# Patient Record
Sex: FEMALE | Race: WHITE | NOT HISPANIC OR LATINO
[De-identification: names, ages, dates, MRNs, and addresses within clinical notes are randomized per-mention and may not be internally consistent; named-entity substitution may affect disease eponyms.]

---
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
## 2018-02-07 PROBLEM — Z00.00 ENCOUNTER FOR PREVENTIVE HEALTH EXAMINATION: Status: ACTIVE | Noted: 2018-02-07

## 2018-03-23 ENCOUNTER — APPOINTMENT (OUTPATIENT)
Dept: NEUROLOGY | Facility: CLINIC | Age: 18
End: 2018-03-23
Payer: COMMERCIAL

## 2018-03-23 VITALS
SYSTOLIC BLOOD PRESSURE: 130 MMHG | WEIGHT: 125 LBS | BODY MASS INDEX: 23.6 KG/M2 | HEIGHT: 61 IN | DIASTOLIC BLOOD PRESSURE: 64 MMHG

## 2018-03-23 DIAGNOSIS — R55 SYNCOPE AND COLLAPSE: ICD-10-CM

## 2018-03-23 DIAGNOSIS — Z78.9 OTHER SPECIFIED HEALTH STATUS: ICD-10-CM

## 2018-03-23 PROCEDURE — 99204 OFFICE O/P NEW MOD 45 MIN: CPT

## 2018-03-23 RX ORDER — LEVONORGESTREL AND ETHINYL ESTRADIOL 0.1-0.02MG
KIT ORAL
Refills: 0 | Status: ACTIVE | COMMUNITY

## 2018-03-23 RX ORDER — AZELASTINE HYDROCHLORIDE 0.5 MG/ML
0.05 SOLUTION/ DROPS OPHTHALMIC
Refills: 0 | Status: ACTIVE | COMMUNITY

## 2018-03-23 RX ORDER — DAPSONE 50 MG/G
5 GEL TOPICAL
Refills: 0 | Status: ACTIVE | COMMUNITY

## 2018-03-23 RX ORDER — ADAPALENE AND BENZOYL PEROXIDE 1; 25 MG/G; MG/G
0.1-2.5 GEL TOPICAL
Refills: 0 | Status: ACTIVE | COMMUNITY

## 2018-06-18 ENCOUNTER — RESULT REVIEW (OUTPATIENT)
Age: 18
End: 2018-06-18

## 2018-06-22 ENCOUNTER — APPOINTMENT (OUTPATIENT)
Dept: NEUROLOGY | Facility: CLINIC | Age: 18
End: 2018-06-22
Payer: COMMERCIAL

## 2018-06-22 VITALS
BODY MASS INDEX: 23.6 KG/M2 | DIASTOLIC BLOOD PRESSURE: 80 MMHG | SYSTOLIC BLOOD PRESSURE: 110 MMHG | HEIGHT: 61 IN | WEIGHT: 125 LBS

## 2018-06-22 PROCEDURE — 99213 OFFICE O/P EST LOW 20 MIN: CPT

## 2018-12-28 ENCOUNTER — APPOINTMENT (OUTPATIENT)
Dept: NEUROLOGY | Facility: CLINIC | Age: 18
End: 2018-12-28
Payer: COMMERCIAL

## 2018-12-28 VITALS
DIASTOLIC BLOOD PRESSURE: 70 MMHG | WEIGHT: 125 LBS | BODY MASS INDEX: 23.6 KG/M2 | SYSTOLIC BLOOD PRESSURE: 120 MMHG | HEIGHT: 61 IN

## 2018-12-28 PROCEDURE — 99213 OFFICE O/P EST LOW 20 MIN: CPT

## 2019-06-17 ENCOUNTER — APPOINTMENT (OUTPATIENT)
Dept: NEUROLOGY | Facility: CLINIC | Age: 19
End: 2019-06-17
Payer: COMMERCIAL

## 2019-06-17 VITALS
DIASTOLIC BLOOD PRESSURE: 70 MMHG | HEIGHT: 61 IN | WEIGHT: 130 LBS | BODY MASS INDEX: 24.55 KG/M2 | SYSTOLIC BLOOD PRESSURE: 110 MMHG

## 2019-06-17 PROCEDURE — 99213 OFFICE O/P EST LOW 20 MIN: CPT

## 2019-06-17 NOTE — ASSESSMENT
[FreeTextEntry1] : This is a 19-year-old woman with stable migraine headache. She had 3 migraine since her last visit. They're relieved with rizatriptan. I still would not start preventive medication. I will see her back in 6 months, sooner should the need arise. I asked her to call me should she have any increase in frequency or severity of her headaches between now and her next appointment.

## 2019-06-17 NOTE — PHYSICAL EXAM
[Person] : oriented to person [Place] : oriented to place [Time] : oriented to time [Remote Intact] : remote memory intact [Registration Intact] : recent registration memory intact [Span Intact] : the attention span was normal [Concentration Intact] : normal concentrating ability [Visual Intact] : visual attention was ~T not ~L decreased [Naming Objects] : no difficulty naming common objects [Comprehension] : comprehension intact [Fluency] : fluency intact [Repeating Phrases] : no difficulty repeating a phrase [Past History] : adequate knowledge of personal past history [Current Events] : adequate knowledge of current events [Cranial Nerves Oculomotor (III)] : extraocular motion intact [Cranial Nerves Optic (II)] : visual acuity intact bilaterally,  visual fields full to confrontation, pupils equal round and reactive to light [Cranial Nerves Trigeminal (V)] : facial sensation intact symmetrically [Cranial Nerves Vestibulocochlear (VIII)] : hearing was intact bilaterally [Cranial Nerves Facial (VII)] : face symmetrical [Cranial Nerves Accessory (XI - Cranial And Spinal)] : head turning and shoulder shrug symmetric [Cranial Nerves Glossopharyngeal (IX)] : tongue and palate midline [Motor Tone] : muscle tone was normal in all four extremities [Motor Strength] : muscle strength was normal in all four extremities [Cranial Nerves Hypoglossal (XII)] : there was no tongue deviation with protrusion [Involuntary Movements] : no involuntary movements were seen [No Muscle Atrophy] : normal bulk in all four extremities [Motor Handedness Right-Handed] : the patient is right hand dominant [Paresis Pronator Drift Left-Sided] : no pronator drift on the left [Paresis Pronator Drift Right-Sided] : no pronator drift on the right [Sensation Tactile Decrease] : light touch was intact [Sensation Pain / Temperature Decrease] : pain and temperature was intact [Sensation Vibration Decrease] : vibration was intact [Abnormal Walk] : normal gait [Proprioception] : proprioception was intact [Tremor] : no tremor present [Coordination - Dysmetria Impaired Finger-to-Nose Bilateral] : not present [Balance] : balance was intact [2+] : Patella left 2+ [Sclera] : the sclera and conjunctiva were normal [PERRL With Normal Accommodation] : pupils were equal in size, round, reactive to light, with normal accommodation [Extraocular Movements] : extraocular movements were intact [Full Visual Field] : full visual field [No APD] : no afferent pupillary defect [No TRINIDAD] : no internuclear ophthalmoplegia

## 2019-06-17 NOTE — CONSULT LETTER
[Courtesy Letter:] : I had the pleasure of seeing your patient, [unfilled], in my office today. [Dear  ___] : Dear  [unfilled], [Please see my note below.] : Please see my note below. [Consult Closing:] : Thank you very much for allowing me to participate in the care of this patient.  If you have any questions, please do not hesitate to contact me. [FreeTextEntry3] : Parish Ramirez M.D., Ph.D. DPN-N\par [facility]\par Neurology at [place]\par Medical Director of Stroke Services\par [facility]\par  [Sincerely,] : Sincerely,

## 2019-06-20 ENCOUNTER — RESULT REVIEW (OUTPATIENT)
Age: 19
End: 2019-06-20

## 2019-12-23 ENCOUNTER — APPOINTMENT (OUTPATIENT)
Dept: NEUROLOGY | Facility: CLINIC | Age: 19
End: 2019-12-23
Payer: COMMERCIAL

## 2019-12-23 VITALS
WEIGHT: 130 LBS | SYSTOLIC BLOOD PRESSURE: 115 MMHG | BODY MASS INDEX: 24.55 KG/M2 | DIASTOLIC BLOOD PRESSURE: 70 MMHG | HEIGHT: 61 IN

## 2019-12-23 PROCEDURE — 99213 OFFICE O/P EST LOW 20 MIN: CPT

## 2019-12-23 NOTE — ASSESSMENT
[FreeTextEntry1] : This is a 19-year-old woman with migraine headaches, there will treated with rizatriptan as needed. She is not having headaches frequently enough to need preventative medication. I will continue with as needed rizatriptan and see her back in the office in 6 months, sooner should the need arise. I did ask her to call me should she have any change in frequency or severity of her headaches that may necessitate a medication adjustment or office visit.

## 2019-12-23 NOTE — HISTORY OF PRESENT ILLNESS
[FreeTextEntry1] : Initial office visit March 23, 2018:\par This is a 18-year-old woman who comes here today for evaluation of migraine headache. She states that since November of 2017 she started to get headaches. They are usually unilateral and start the temple and go down the side of her face into her jaw. She states she feels as if her head is being squeezed at times. She also feels a throbbing pressure in her head. These headaches are accompanied with photophobia where she needs to lie in a dark quiet room as well as nausea without vomiting. 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 She is having these headaches on average once or twice per month. She was given rizatriptan which helps but not 100%. She is here today for neurologic evaluation and treatment.\par \par Followup June 22, 2018:\par This is a 18-year-old woman who returns today for neurologic followup of migraine headache. Since her last visit she states she's modified her diet which is helped with her headaches significantly. She is now having them on average once a month and they're mostly associated with her period. She did go out for fast food one day and had a headache after that. Other than that she's been doing well taking rizatriptan as needed. She is here today for neurologic followup.\par \par Followup December 28, 2018:\par This is a 18-year-old woman who presents today for followup of migraine headaches. She states that in the 6 months since her last visit she's had 3 migraine headaches. These were relieved with her rizatriptan. She does get a tingling and kind of feeling in her arms when she takes Iressa tripped and has trouble using them for an hour or so. This sensation stops and her headache is controlled. She also has smaller headaches around the time of her periods which she treats with as needed ibuprofen. She is here today for neurologic followup overall doing very well.\par \par Followup June 17, 2019:\par This is a 19-year-old woman who presents today for followup of migraine headaches. Since her last visit she had 2 migraines in March and one migraine this month. Other than that she's been free of migraines but has some mild headaches occasionally, especially during her menstrual period. She gets relief from the rise a trip and when she takes it. She is here today for neurologic followup.\par \par Followup December 23, 2019:\par This is a 19-year-old woman who presents today for followup of migraine headache. She is averaging one to 2 migraine headaches per month. She had a bit of her time in September with more frequent headaches. She attributes this to stress and increased workload at school. She also has increased migraines with raining changes in barometric pressure. She reports that the rizatriptan still helps although sometimes she still needs to sleep off the headaches afterwards. She is here today for neurologic followup.

## 2019-12-23 NOTE — PHYSICAL EXAM
[Person] : oriented to person [Place] : oriented to place [Time] : oriented to time [Remote Intact] : remote memory intact [Registration Intact] : recent registration memory intact [Span Intact] : the attention span was normal [Concentration Intact] : normal concentrating ability [Visual Intact] : visual attention was ~T not ~L decreased [Naming Objects] : no difficulty naming common objects [Repeating Phrases] : no difficulty repeating a phrase [Fluency] : fluency intact [Comprehension] : comprehension intact [Current Events] : adequate knowledge of current events [Past History] : adequate knowledge of personal past history [Cranial Nerves Optic (II)] : visual acuity intact bilaterally,  visual fields full to confrontation, pupils equal round and reactive to light [Cranial Nerves Oculomotor (III)] : extraocular motion intact [Cranial Nerves Trigeminal (V)] : facial sensation intact symmetrically [Cranial Nerves Facial (VII)] : face symmetrical [Cranial Nerves Glossopharyngeal (IX)] : tongue and palate midline [Cranial Nerves Vestibulocochlear (VIII)] : hearing was intact bilaterally [Cranial Nerves Hypoglossal (XII)] : there was no tongue deviation with protrusion [Motor Tone] : muscle tone was normal in all four extremities [Cranial Nerves Accessory (XI - Cranial And Spinal)] : head turning and shoulder shrug symmetric [Motor Strength] : muscle strength was normal in all four extremities [Involuntary Movements] : no involuntary movements were seen [No Muscle Atrophy] : normal bulk in all four extremities [Motor Handedness Right-Handed] : the patient is right hand dominant [Paresis Pronator Drift Right-Sided] : no pronator drift on the right [Paresis Pronator Drift Left-Sided] : no pronator drift on the left [Sensation Tactile Decrease] : light touch was intact [Sensation Vibration Decrease] : vibration was intact [Sensation Pain / Temperature Decrease] : pain and temperature was intact [Proprioception] : proprioception was intact [Abnormal Walk] : normal gait [Balance] : balance was intact [Tremor] : no tremor present [Coordination - Dysmetria Impaired Finger-to-Nose Bilateral] : not present [2+] : Patella left 2+ [Sclera] : the sclera and conjunctiva were normal [PERRL With Normal Accommodation] : pupils were equal in size, round, reactive to light, with normal accommodation [Extraocular Movements] : extraocular movements were intact [No APD] : no afferent pupillary defect [No TRINIDAD] : no internuclear ophthalmoplegia [Full Visual Field] : full visual field

## 2019-12-23 NOTE — CONSULT LETTER
[Dear  ___] : Dear  [unfilled], [Courtesy Letter:] : I had the pleasure of seeing your patient, [unfilled], in my office today. [Please see my note below.] : Please see my note below. [Consult Closing:] : Thank you very much for allowing me to participate in the care of this patient.  If you have any questions, please do not hesitate to contact me. [Sincerely,] : Sincerely, [FreeTextEntry3] : Parish Ramirez M.D., Ph.D. DPN-N\par [facility]\par Neurology at [place]\par Medical Director of Stroke Services\par [facility]\par

## 2020-06-22 ENCOUNTER — APPOINTMENT (OUTPATIENT)
Dept: NEUROLOGY | Facility: CLINIC | Age: 20
End: 2020-06-22
Payer: COMMERCIAL

## 2020-06-22 PROCEDURE — 99213 OFFICE O/P EST LOW 20 MIN: CPT | Mod: 95

## 2020-06-22 NOTE — HISTORY OF PRESENT ILLNESS
[Home] : at home, [unfilled] , at the time of the visit. [Verbal consent obtained from patient] : the patient, [unfilled] [Medical Office: ([facility])___] : at the medical office located in  [FreeTextEntry1] : Initial office visit March 23, 2018:\par This is a 18-year-old woman who comes here today for evaluation of migraine headache. She states that since November of 2017 she started to get headaches. They are usually unilateral and start the temple and go down the side of her face into her jaw. She states she feels as if her head is being squeezed at times. She also feels a throbbing pressure in her head. These headaches are accompanied with photophobia where she needs to lie in a dark quiet room as well as nausea without vomiting. 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 She is having these headaches on average once or twice per month. She was given rizatriptan which helps but not 100%. She is here today for neurologic evaluation and treatment.\par \par Followup June 22, 2018:\par This is a 18-year-old woman who returns today for neurologic followup of migraine headache. Since her last visit she states she's modified her diet which is helped with her headaches significantly. She is now having them on average once a month and they're mostly associated with her period. She did go out for fast food one day and had a headache after that. Other than that she's been doing well taking rizatriptan as needed. She is here today for neurologic followup.\par \par Followup December 28, 2018:\par This is a 18-year-old woman who presents today for followup of migraine headaches. She states that in the 6 months since her last visit she's had 3 migraine headaches. These were relieved with her rizatriptan. She does get a tingling and kind of feeling in her arms when she takes Iressa tripped and has trouble using them for an hour or so. This sensation stops and her headache is controlled. She also has smaller headaches around the time of her periods which she treats with as needed ibuprofen. She is here today for neurologic followup overall doing very well.\par \par Followup June 17, 2019:\par This is a 19-year-old woman who presents today for followup of migraine headaches. Since her last visit she had 2 migraines in March and one migraine this month. Other than that she's been free of migraines but has some mild headaches occasionally, especially during her menstrual period. She gets relief from the rise a trip and when she takes it. She is here today for neurologic followup.\par \par Followup December 23, 2019:\par This is a 19-year-old woman who presents today for followup of migraine headache. She is averaging one to 2 migraine headaches per month. She had a bit of her time in September with more frequent headaches. She attributes this to stress and increased workload at school. She also has increased migraines with raining changes in barometric pressure. She reports that the rizatriptan still helps although sometimes she still needs to sleep off the headaches afterwards. She is here today for neurologic followup.\par \par Followup June 22, 2020:\par This is a 20-year-old woman who presents today for followup of migraine headache. This is done via video conference during the corona virus outbreak. Verbal consent is obtained at the beginning of the visit. She states that she is getting a bit more migraines. This is due to increased stress as well as heat brings on her migraines. She continues to take rizatriptan as needed which is affective. As opposed to having one to 2 migraines per month she is currently having 3 or maybe 4 migraines per month. Now that she has graduated college her stress levels a bit data and hopefully her migraines will subside a bit. She is here today via video for routine neurologic followup.

## 2020-06-22 NOTE — ASSESSMENT
[FreeTextEntry1] : This is a 21-year-old woman with migraine headache. They respond well to rizatriptan. I will continue this. She is having about 3 or 4 headaches per month, this is due to increased stress and hopefully now that her stress level is hopefully a bit lower her frequency will also diminished. I would not start a preventive medicine at this time. She agrees with that. I will see her back in 6 months, sooner should the need arise.

## 2020-06-22 NOTE — CONSULT LETTER
[Dear  ___] : Dear  [unfilled], [Courtesy Letter:] : I had the pleasure of seeing your patient, [unfilled], in my office today. [Consult Closing:] : Thank you very much for allowing me to participate in the care of this patient.  If you have any questions, please do not hesitate to contact me. [Please see my note below.] : Please see my note below. [FreeTextEntry3] : Parish Ramirez M.D., Ph.D. DPN-N\par [facility]\par Neurology at [place]\par Medical Director of Stroke Services\par [facility]\par  [Sincerely,] : Sincerely,

## 2020-06-26 ENCOUNTER — RESULT REVIEW (OUTPATIENT)
Age: 20
End: 2020-06-26

## 2020-12-28 ENCOUNTER — APPOINTMENT (OUTPATIENT)
Dept: NEUROLOGY | Facility: CLINIC | Age: 20
End: 2020-12-28
Payer: COMMERCIAL

## 2020-12-28 PROCEDURE — 99213 OFFICE O/P EST LOW 20 MIN: CPT | Mod: 95

## 2020-12-28 RX ORDER — BUPROPION HYDROCHLORIDE 150 MG/1
150 TABLET, EXTENDED RELEASE ORAL
Qty: 30 | Refills: 0 | Status: ACTIVE | COMMUNITY
Start: 2020-11-19

## 2020-12-28 NOTE — HISTORY OF PRESENT ILLNESS
[Home] : at home, [unfilled] , at the time of the visit. [Medical Office: ([facility])___] : at the medical office located in  [Verbal consent obtained from patient] : the patient, [unfilled] [FreeTextEntry1] : Initial office visit March 23, 2018:\par This is a 18-year-old woman who comes here today for evaluation of migraine headache. She states that since November of 2017 she started to get headaches. They are usually unilateral and start the temple and go down the side of her face into her jaw. She states she feels as if her head is being squeezed at times. She also feels a throbbing pressure in her head. These headaches are accompanied with photophobia where she needs to lie in a dark quiet room as well as nausea without vomiting. 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 She is having these headaches on average once or twice per month. She was given rizatriptan which helps but not 100%. She is here today for neurologic evaluation and treatment.\par \par Followup June 22, 2018:\par This is a 18-year-old woman who returns today for neurologic followup of migraine headache. Since her last visit she states she's modified her diet which is helped with her headaches significantly. She is now having them on average once a month and they're mostly associated with her period. She did go out for fast food one day and had a headache after that. Other than that she's been doing well taking rizatriptan as needed. She is here today for neurologic followup.\par \par Followup December 28, 2018:\par This is a 18-year-old woman who presents today for followup of migraine headaches. She states that in the 6 months since her last visit she's had 3 migraine headaches. These were relieved with her rizatriptan. She does get a tingling and kind of feeling in her arms when she takes Iressa tripped and has trouble using them for an hour or so. This sensation stops and her headache is controlled. She also has smaller headaches around the time of her periods which she treats with as needed ibuprofen. She is here today for neurologic followup overall doing very well.\par \par Followup June 17, 2019:\par This is a 19-year-old woman who presents today for followup of migraine headaches. Since her last visit she had 2 migraines in March and one migraine this month. Other than that she's been free of migraines but has some mild headaches occasionally, especially during her menstrual period. She gets relief from the rise a trip and when she takes it. She is here today for neurologic followup.\par \par Followup December 23, 2019:\par This is a 19-year-old woman who presents today for followup of migraine headache. She is averaging one to 2 migraine headaches per month. She had a bit of her time in September with more frequent headaches. She attributes this to stress and increased workload at school. She also has increased migraines with raining changes in barometric pressure. She reports that the rizatriptan still helps although sometimes she still needs to sleep off the headaches afterwards. She is here today for neurologic followup.\par \par Followup June 22, 2020:\par This is a 20-year-old woman who presents today for followup of migraine headache. This is done via video conference during the corona virus outbreak. Verbal consent is obtained at the beginning of the visit. She states that she is getting a bit more migraines. This is due to increased stress as well as heat brings on her migraines. She continues to take rizatriptan as needed which is affective. As opposed to having one to 2 migraines per month she is currently having 3 or maybe 4 migraines per month. Now that she has graduated college her stress levels a bit data and hopefully her migraines will subside a bit. She is here today via video for routine neurologic followup.\par \par Followup December 28, 2020:\par This is a 20-year-old woman who presents today for neurologic followup of migraine headache. She is seen via video visit during corona virus outbreak. Verbal consent was obtained at the time of the visit. She states that she is currently doing very well. Since her last visit she has started treatment for anxiety with Wellbutrin. Since starting the Wellbutrin she's had no further migraine headaches. Prior to starting at her migraine headaches were increased. She states she hasn't had the need to take Maxalt in over 3 months now. She is here today by video for neurologic followup doing quite well.

## 2020-12-28 NOTE — ASSESSMENT
[FreeTextEntry1] : This is a 20-year-old woman with migraine headache. She's doing much better since her anxiety is being treated better. At this point I would recommend to continue Wellbutrin per her psychiatric nurse practitioner. She will continue taking Maxalt as needed. I will see her back in 6 months, sooner should the need arise. I asked her to call me in the interim with any problems, questions or concerns.

## 2020-12-28 NOTE — CONSULT LETTER
[Dear  ___] : Dear  [unfilled], [Courtesy Letter:] : I had the pleasure of seeing your patient, [unfilled], in my office today. [Please see my note below.] : Please see my note below. [Consult Closing:] : Thank you very much for allowing me to participate in the care of this patient.  If you have any questions, please do not hesitate to contact me. [Sincerely,] : Sincerely, [FreeTextEntry3] : Parish Ramirez M.D., Ph.D. DPN-N\par [facility]\par Neurology at [place]\par Medical Director of Stroke Services\par [facility]\par

## 2021-06-29 ENCOUNTER — APPOINTMENT (OUTPATIENT)
Dept: NEUROLOGY | Facility: CLINIC | Age: 21
End: 2021-06-29
Payer: COMMERCIAL

## 2021-06-29 PROCEDURE — 99213 OFFICE O/P EST LOW 20 MIN: CPT

## 2021-06-29 PROCEDURE — 99072 ADDL SUPL MATRL&STAF TM PHE: CPT

## 2021-06-29 NOTE — CONSULT LETTER
[Dear  ___] : Dear  [unfilled], [Courtesy Letter:] : I had the pleasure of seeing your patient, [unfilled], in my office today. [Please see my note below.] : Please see my note below. [Consult Closing:] : Thank you very much for allowing me to participate in the care of this patient.  If you have any questions, please do not hesitate to contact me. [Sincerely,] : Sincerely, [FreeTextEntry3] : Parish Ramirez M.D., Ph.D. DPN-N\par [facility]\par Neurology at [place]\par Medical Director of Stroke Services\par [facility]\par

## 2021-06-29 NOTE — HISTORY OF PRESENT ILLNESS
[FreeTextEntry1] : Initial office visit March 23, 2018:\par This is a 18-year-old woman who comes here today for evaluation of migraine headache. She states that since November of 2017 she started to get headaches. They are usually unilateral and start the temple and go down the side of her face into her jaw. She states she feels as if her head is being squeezed at times. She also feels a throbbing pressure in her head. These headaches are accompanied with photophobia where she needs to lie in a dark quiet room as well as nausea without vomiting. 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 She is having these headaches on average once or twice per month. She was given rizatriptan which helps but not 100%. She is here today for neurologic evaluation and treatment.\par \par Followup June 22, 2018:\par This is a 18-year-old woman who returns today for neurologic followup of migraine headache. Since her last visit she states she's modified her diet which is helped with her headaches significantly. She is now having them on average once a month and they're mostly associated with her period. She did go out for fast food one day and had a headache after that. Other than that she's been doing well taking rizatriptan as needed. She is here today for neurologic followup.\par \par Followup December 28, 2018:\par This is a 18-year-old woman who presents today for followup of migraine headaches. She states that in the 6 months since her last visit she's had 3 migraine headaches. These were relieved with her rizatriptan. She does get a tingling and kind of feeling in her arms when she takes Iressa tripped and has trouble using them for an hour or so. This sensation stops and her headache is controlled. She also has smaller headaches around the time of her periods which she treats with as needed ibuprofen. She is here today for neurologic followup overall doing very well.\par \par Followup June 17, 2019:\par This is a 19-year-old woman who presents today for followup of migraine headaches. Since her last visit she had 2 migraines in March and one migraine this month. Other than that she's been free of migraines but has some mild headaches occasionally, especially during her menstrual period. She gets relief from the rise a trip and when she takes it. She is here today for neurologic followup.\par \par Followup December 23, 2019:\par This is a 19-year-old woman who presents today for followup of migraine headache. She is averaging one to 2 migraine headaches per month. She had a bit of her time in September with more frequent headaches. She attributes this to stress and increased workload at school. She also has increased migraines with raining changes in barometric pressure. She reports that the rizatriptan still helps although sometimes she still needs to sleep off the headaches afterwards. She is here today for neurologic followup.\par \par Followup June 22, 2020:\par This is a 20-year-old woman who presents today for followup of migraine headache. This is done via video conference during the corona virus outbreak. Verbal consent is obtained at the beginning of the visit. She states that she is getting a bit more migraines. This is due to increased stress as well as heat brings on her migraines. She continues to take rizatriptan as needed which is affective. As opposed to having one to 2 migraines per month she is currently having 3 or maybe 4 migraines per month. Now that she has graduated college her stress levels a bit data and hopefully her migraines will subside a bit. She is here today via video for routine neurologic followup.\par \par Followup December 28, 2020:\par This is a 20-year-old woman who presents today for neurologic followup of migraine headache. She is seen via video visit during corona virus outbreak. Verbal consent was obtained at the time of the visit. She states that she is currently doing very well. Since her last visit she has started treatment for anxiety with Wellbutrin. Since starting the Wellbutrin she's had no further migraine headaches. Prior to starting at her migraine headaches were increased. She states she hasn't had the need to take Maxalt in over 3 months now. She is here today by video for neurologic followup doing quite well.\par \par Followup June 29, 2021:\par This is a 21-year-old woman who presents today for followup of migraine headache. Since her last visit she is been experiencing a few more migraine headaches. However these are still treated with Maxalt well. She states that they have always been some months where she has more headaches another month where she has severe headaches. The headaches are similar in intensity and quality and character. She is here today for routine neurologic followup.

## 2021-06-29 NOTE — ASSESSMENT
[FreeTextEntry1] : This is a 21-year-old woman with migraine headache. She is having her headaches a month. They're not enough to warrant her preventative therapy. They're still responding well to Maxalt. I will continue Maxalt for her headaches and see her back in 6 months, sooner should the need arise.

## 2021-06-29 NOTE — PHYSICAL EXAM

## 2021-07-07 ENCOUNTER — RESULT REVIEW (OUTPATIENT)
Age: 21
End: 2021-07-07

## 2022-01-13 ENCOUNTER — APPOINTMENT (OUTPATIENT)
Dept: NEUROLOGY | Facility: CLINIC | Age: 22
End: 2022-01-13
Payer: COMMERCIAL

## 2022-01-13 PROCEDURE — 99213 OFFICE O/P EST LOW 20 MIN: CPT | Mod: 95

## 2022-01-13 NOTE — CONSULT LETTER
[Dear  ___] : Dear  [unfilled], [Courtesy Letter:] : I had the pleasure of seeing your patient, [unfilled], in my office today. [Please see my note below.] : Please see my note below. [Consult Closing:] : Thank you very much for allowing me to participate in the care of this patient.  If you have any questions, please do not hesitate to contact me. [Sincerely,] : Sincerely, [FreeTextEntry3] : Parish Ramirez M.D., Ph.D. DPN-N\par [facility]\par Neurology at [place]\par Medical Director of Stroke Services\par [facility]\par

## 2022-01-13 NOTE — HISTORY OF PRESENT ILLNESS
[Home] : at home, [unfilled] , at the time of the visit. [Medical Office: ([facility])___] : at the medical office located in  [Verbal consent obtained from patient] : the patient, [unfilled] [FreeTextEntry1] : Initial office visit March 23, 2018:\par This is a 18-year-old woman who comes here today for evaluation of migraine headache. She states that since November of 2017 she started to get headaches. They are usually unilateral and start the temple and go down the side of her face into her jaw. She states she feels as if her head is being squeezed at times. She also feels a throbbing pressure in her head. These headaches are accompanied with photophobia where she needs to lie in a dark quiet room as well as nausea without vomiting. 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 She is having these headaches on average once or twice per month. She was given rizatriptan which helps but not 100%. She is here today for neurologic evaluation and treatment.\par \par Followup June 22, 2018:\par This is a 18-year-old woman who returns today for neurologic followup of migraine headache. Since her last visit she states she's modified her diet which is helped with her headaches significantly. She is now having them on average once a month and they're mostly associated with her period. She did go out for fast food one day and had a headache after that. Other than that she's been doing well taking rizatriptan as needed. She is here today for neurologic followup.\par \par Followup December 28, 2018:\par This is a 18-year-old woman who presents today for followup of migraine headaches. She states that in the 6 months since her last visit she's had 3 migraine headaches. These were relieved with her rizatriptan. She does get a tingling and kind of feeling in her arms when she takes Iressa tripped and has trouble using them for an hour or so. This sensation stops and her headache is controlled. She also has smaller headaches around the time of her periods which she treats with as needed ibuprofen. She is here today for neurologic followup overall doing very well.\par \par Followup June 17, 2019:\par This is a 19-year-old woman who presents today for followup of migraine headaches. Since her last visit she had 2 migraines in March and one migraine this month. Other than that she's been free of migraines but has some mild headaches occasionally, especially during her menstrual period. She gets relief from the rise a trip and when she takes it. She is here today for neurologic followup.\par \par Followup December 23, 2019:\par This is a 19-year-old woman who presents today for followup of migraine headache. She is averaging one to 2 migraine headaches per month. She had a bit of her time in September with more frequent headaches. She attributes this to stress and increased workload at school. She also has increased migraines with raining changes in barometric pressure. She reports that the rizatriptan still helps although sometimes she still needs to sleep off the headaches afterwards. She is here today for neurologic followup.\par \par Followup June 22, 2020:\par This is a 20-year-old woman who presents today for followup of migraine headache. This is done via video conference during the corona virus outbreak. Verbal consent is obtained at the beginning of the visit. She states that she is getting a bit more migraines. This is due to increased stress as well as heat brings on her migraines. She continues to take rizatriptan as needed which is affective. As opposed to having one to 2 migraines per month she is currently having 3 or maybe 4 migraines per month. Now that she has graduated college her stress levels a bit data and hopefully her migraines will subside a bit. She is here today via video for routine neurologic followup.\par \par Followup December 28, 2020:\par This is a 20-year-old woman who presents today for neurologic followup of migraine headache. She is seen via video visit during corona virus outbreak. Verbal consent was obtained at the time of the visit. She states that she is currently doing very well. Since her last visit she has started treatment for anxiety with Wellbutrin. Since starting the Wellbutrin she's had no further migraine headaches. Prior to starting at her migraine headaches were increased. She states she hasn't had the need to take Maxalt in over 3 months now. She is here today by video for neurologic followup doing quite well.\par \par Followup June 29, 2021:\par This is a 21-year-old woman who presents today for followup of migraine headache. Since her last visit she is been experiencing a few more migraine headaches. However these are still treated with Maxalt well. She states that they have always been some months where she has more headaches another month where she has severe headaches. The headaches are similar in intensity and quality and character. She is here today for routine neurologic followup.\par \par Followup January 13, 2022:\par This is a 21-year-old woman who presents today for followup of migraine headache. She is doing a bit better stating that now she go months without headaches. When she does get them they can be painful there are associated with stress. She utilizes rizatriptan for breakthrough headaches she takes time it helps. She is here today for neurologic followup visit.

## 2022-01-13 NOTE — ASSESSMENT
[FreeTextEntry1] : This is a 21-year-old woman with intermittent migraine-type headache. She utilizes rizatriptan as needed. She will continue this. I will see her back in 6 months, sooner should the need arise.

## 2022-07-15 ENCOUNTER — RESULT REVIEW (OUTPATIENT)
Age: 22
End: 2022-07-15

## 2022-09-13 ENCOUNTER — APPOINTMENT (OUTPATIENT)
Dept: NEUROLOGY | Facility: CLINIC | Age: 22
End: 2022-09-13

## 2022-09-13 VITALS
BODY MASS INDEX: 27.38 KG/M2 | DIASTOLIC BLOOD PRESSURE: 70 MMHG | SYSTOLIC BLOOD PRESSURE: 116 MMHG | HEIGHT: 61 IN | WEIGHT: 145 LBS

## 2022-09-13 PROCEDURE — 99214 OFFICE O/P EST MOD 30 MIN: CPT

## 2022-09-13 NOTE — CONSULT LETTER
[Dear  ___] : Dear  [unfilled], [Courtesy Letter:] : I had the pleasure of seeing your patient, [unfilled], in my office today. [Please see my note below.] : Please see my note below. [Consult Closing:] : Thank you very much for allowing me to participate in the care of this patient.  If you have any questions, please do not hesitate to contact me. [Sincerely,] : Sincerely, [FreeTextEntry3] : Parish Ramirez M.D., Ph.D. DPN-N\par [facility]\par Neurology at [place]\par Medical Director of Stroke Services\par [facility]\par

## 2022-09-13 NOTE — HISTORY OF PRESENT ILLNESS
[FreeTextEntry1] : Initial office visit March 23, 2018:\par This is a 18-year-old woman who comes here today for evaluation of migraine headache. She states that since November of 2017 she started to get headaches. They are usually unilateral and start the temple and go down the side of her face into her jaw. She states she feels as if her head is being squeezed at times. She also feels a throbbing pressure in her head. These headaches are accompanied with photophobia where she needs to lie in a dark quiet room as well as nausea without vomiting. 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 She is having these headaches on average once or twice per month. She was given rizatriptan which helps but not 100%. She is here today for neurologic evaluation and treatment.\par \par Followup June 22, 2018:\par This is a 18-year-old woman who returns today for neurologic followup of migraine headache. Since her last visit she states she's modified her diet which is helped with her headaches significantly. She is now having them on average once a month and they're mostly associated with her period. She did go out for fast food one day and had a headache after that. Other than that she's been doing well taking rizatriptan as needed. She is here today for neurologic followup.\par \par Followup December 28, 2018:\par This is a 18-year-old woman who presents today for followup of migraine headaches. She states that in the 6 months since her last visit she's had 3 migraine headaches. These were relieved with her rizatriptan. She does get a tingling and kind of feeling in her arms when she takes Iressa tripped and has trouble using them for an hour or so. This sensation stops and her headache is controlled. She also has smaller headaches around the time of her periods which she treats with as needed ibuprofen. She is here today for neurologic followup overall doing very well.\par \par Followup June 17, 2019:\par This is a 19-year-old woman who presents today for followup of migraine headaches. Since her last visit she had 2 migraines in March and one migraine this month. Other than that she's been free of migraines but has some mild headaches occasionally, especially during her menstrual period. She gets relief from the rise a trip and when she takes it. She is here today for neurologic followup.\par \par Followup December 23, 2019:\par This is a 19-year-old woman who presents today for followup of migraine headache. She is averaging one to 2 migraine headaches per month. She had a bit of her time in September with more frequent headaches. She attributes this to stress and increased workload at school. She also has increased migraines with raining changes in barometric pressure. She reports that the rizatriptan still helps although sometimes she still needs to sleep off the headaches afterwards. She is here today for neurologic followup.\par \par Followup June 22, 2020:\par This is a 20-year-old woman who presents today for followup of migraine headache. This is done via video conference during the corona virus outbreak. Verbal consent is obtained at the beginning of the visit. She states that she is getting a bit more migraines. This is due to increased stress as well as heat brings on her migraines. She continues to take rizatriptan as needed which is affective. As opposed to having one to 2 migraines per month she is currently having 3 or maybe 4 migraines per month. Now that she has graduated college her stress levels a bit data and hopefully her migraines will subside a bit. She is here today via video for routine neurologic followup.\par \par Followup December 28, 2020:\par This is a 20-year-old woman who presents today for neurologic followup of migraine headache. She is seen via video visit during corona virus outbreak. Verbal consent was obtained at the time of the visit. She states that she is currently doing very well. Since her last visit she has started treatment for anxiety with Wellbutrin. Since starting the Wellbutrin she's had no further migraine headaches. Prior to starting at her migraine headaches were increased. She states she hasn't had the need to take Maxalt in over 3 months now. She is here today by video for neurologic followup doing quite well.\par \par Followup June 29, 2021:\par This is a 21-year-old woman who presents today for followup of migraine headache. Since her last visit she is been experiencing a few more migraine headaches. However these are still treated with Maxalt well. She states that they have always been some months where she has more headaches another month where she has severe headaches. The headaches are similar in intensity and quality and character. She is here today for routine neurologic followup.\par \par Followup January 13, 2022:\par This is a 21-year-old woman who presents today for followup of migraine headache. She is doing a bit better stating that now she go months without headaches. When she does get them they can be painful there are associated with stress. She utilizes rizatriptan for breakthrough headaches she takes time it helps. She is here today for neurologic followup visit.\par \par Follow-up September 13, 2022:\par This is a 22-year-old woman who presents today for neurologic follow-up of migraine headache.  She is currently still having migraines about twice per month.  She takes rizatriptan which helps with the pain.  However now she is noticing that her headaches are accompanied more often with nausea and vomiting.  This is new for her.  She is otherwise well without any new neurologic complaints.

## 2022-09-13 NOTE — ASSESSMENT
[FreeTextEntry1] : This is a 22-year-old woman with migraine headaches.  Although she still having only 1-2 headaches per month they are now associated with worsening nausea with vomiting.  At this point I would like to add as needed Zofran for nausea that she experiences as part of her migraine.  I have asked her to call me for any changes in frequency severity or pattern of migraines.  I will see her back in 1 year, sooner should the need arise.

## 2023-09-19 ENCOUNTER — APPOINTMENT (OUTPATIENT)
Dept: NEUROLOGY | Facility: CLINIC | Age: 23
End: 2023-09-19
Payer: COMMERCIAL

## 2023-09-19 DIAGNOSIS — G43.109 MIGRAINE WITH AURA, NOT INTRACTABLE, W/OUT STATUS MIGRAINOSUS: ICD-10-CM

## 2023-09-19 PROCEDURE — 99213 OFFICE O/P EST LOW 20 MIN: CPT

## 2023-09-19 RX ORDER — RIZATRIPTAN BENZOATE 10 MG/1
10 TABLET, ORALLY DISINTEGRATING ORAL
Refills: 0 | Status: ACTIVE | COMMUNITY

## 2023-09-19 RX ORDER — ONDANSETRON 4 MG/1
4 TABLET, ORALLY DISINTEGRATING ORAL DAILY
Qty: 30 | Refills: 5 | Status: ACTIVE | COMMUNITY
Start: 2022-09-13

## 2024-07-10 NOTE — HISTORY OF PRESENT ILLNESS
Quality 226: Preventive Care And Screening: Tobacco Use: Screening And Cessation Intervention: Patient screened for tobacco use and is an ex/non-smoker Detail Level: Detailed [FreeTextEntry1] : Initial office visit March 23, 2018:\par This is a 18-year-old woman who comes here today for evaluation of migraine headache. She states that since November of 2017 she started to get headaches. They are usually unilateral and start the temple and go down the side of her face into her jaw. She states she feels as if her head is being squeezed at times. She also feels a throbbing pressure in her head. These headaches are accompanied with photophobia where she needs to lie in a dark quiet room as well as nausea without vomiting. 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 She is having these headaches on average once or twice per month. She was given rizatriptan which helps but not 100%. She is here today for neurologic evaluation and treatment.\par \par Followup June 22, 2018:\par This is a 18-year-old woman who returns today for neurologic followup of migraine headache. Since her last visit she states she's modified her diet which is helped with her headaches significantly. She is now having them on average once a month and they're mostly associated with her period. She did go out for fast food one day and had a headache after that. Other than that she's been doing well taking rizatriptan as needed. She is here today for neurologic followup.\par \par Followup December 28, 2018:\par This is a 18-year-old woman who presents today for followup of migraine headaches. She states that in the 6 months since her last visit she's had 3 migraine headaches. These were relieved with her rizatriptan. She does get a tingling and kind of feeling in her arms when she takes Iressa tripped and has trouble using them for an hour or so. This sensation stops and her headache is controlled. She also has smaller headaches around the time of her periods which she treats with as needed ibuprofen. She is here today for neurologic followup overall doing very well.\par \par Followup June 17, 2019:\par This is a 19-year-old woman who presents today for followup of migraine headaches. Since her last visit she had 2 migraines in March and one migraine this month. Other than that she's been free of migraines but has some mild headaches occasionally, especially during her menstrual period. She gets relief from the rise a trip and when she takes it. She is here today for neurologic followup.

## 2024-09-16 ENCOUNTER — APPOINTMENT (OUTPATIENT)
Dept: NEUROLOGY | Facility: CLINIC | Age: 24
End: 2024-09-16
Payer: COMMERCIAL

## 2024-09-16 VITALS
BODY MASS INDEX: 28.32 KG/M2 | WEIGHT: 150 LBS | SYSTOLIC BLOOD PRESSURE: 113 MMHG | DIASTOLIC BLOOD PRESSURE: 72 MMHG | HEIGHT: 61 IN

## 2024-09-16 DIAGNOSIS — G43.109 MIGRAINE WITH AURA, NOT INTRACTABLE, W/OUT STATUS MIGRAINOSUS: ICD-10-CM

## 2024-09-16 PROCEDURE — 99213 OFFICE O/P EST LOW 20 MIN: CPT

## 2024-09-16 PROCEDURE — G2211 COMPLEX E/M VISIT ADD ON: CPT | Mod: NC

## 2024-09-16 NOTE — CONSULT LETTER
[Dear  ___] : Dear  [unfilled], [Courtesy Letter:] : I had the pleasure of seeing your patient, [unfilled], in my office today. [Please see my note below.] : Please see my note below. [Consult Closing:] : Thank you very much for allowing me to participate in the care of this patient.  If you have any questions, please do not hesitate to contact me. [Sincerely,] : Sincerely, [FreeTextEntry3] : Parish Ramirez M.D., Ph.D. DPN-N [facility] Neurology at [place] Director, Division of Neurology Director, [facility] St[facility]

## 2024-09-16 NOTE — ASSESSMENT
[FreeTextEntry1] : This is a 24-year-old woman with migraine headache.  She is doing much better now that she is in a less stressful situation.  She is only having 2 headaches per year on average.  We will continue rizatriptan and Zofran as needed.  I will see her back in 1 year, sooner should the need arise.

## 2024-09-16 NOTE — HISTORY OF PRESENT ILLNESS
[FreeTextEntry1] : Initial office visit March 23, 2018: This is a 18-year-old woman who comes here today for evaluation of migraine headache. She states that since November of 2017 she started to get headaches. They are usually unilateral and start the temple and go down the side of her face into her jaw. She states she feels as if her head is being squeezed at times. She also feels a throbbing pressure in her head. These headaches are accompanied with photophobia where she needs to lie in a dark quiet room as well as nausea without vomiting. 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 She is having these headaches on average once or twice per month. She was given rizatriptan which helps but not 100%. She is here today for neurologic evaluation and treatment.  Followup June 22, 2018: This is a 18-year-old woman who returns today for neurologic followup of migraine headache. Since her last visit she states she's modified her diet which is helped with her headaches significantly. She is now having them on average once a month and they're mostly associated with her period. She did go out for fast food one day and had a headache after that. Other than that she's been doing well taking rizatriptan as needed. She is here today for neurologic followup.  Followup December 28, 2018: This is a 18-year-old woman who presents today for followup of migraine headaches. She states that in the 6 months since her last visit she's had 3 migraine headaches. These were relieved with her rizatriptan. She does get a tingling and kind of feeling in her arms when she takes Iressa tripped and has trouble using them for an hour or so. This sensation stops and her headache is controlled. She also has smaller headaches around the time of her periods which she treats with as needed ibuprofen. She is here today for neurologic followup overall doing very well.  Followup June 17, 2019: This is a 19-year-old woman who presents today for followup of migraine headaches. Since her last visit she had 2 migraines in March and one migraine this month. Other than that she's been free of migraines but has some mild headaches occasionally, especially during her menstrual period. She gets relief from the rise a trip and when she takes it. She is here today for neurologic followup.  Followup December 23, 2019: This is a 19-year-old woman who presents today for followup of migraine headache. She is averaging one to 2 migraine headaches per month. She had a bit of her time in September with more frequent headaches. She attributes this to stress and increased workload at school. She also has increased migraines with raining changes in barometric pressure. She reports that the rizatriptan still helps although sometimes she still needs to sleep off the headaches afterwards. She is here today for neurologic followup.  Followup June 22, 2020: This is a 20-year-old woman who presents today for followup of migraine headache. This is done via video conference during the corona virus outbreak. Verbal consent is obtained at the beginning of the visit. She states that she is getting a bit more migraines. This is due to increased stress as well as heat brings on her migraines. She continues to take rizatriptan as needed which is affective. As opposed to having one to 2 migraines per month she is currently having 3 or maybe 4 migraines per month. Now that she has graduated college her stress levels a bit data and hopefully her migraines will subside a bit. She is here today via video for routine neurologic followup.  Followup December 28, 2020: This is a 20-year-old woman who presents today for neurologic followup of migraine headache. She is seen via video visit during corona virus outbreak. Verbal consent was obtained at the time of the visit. She states that she is currently doing very well. Since her last visit she has started treatment for anxiety with Wellbutrin. Since starting the Wellbutrin she's had no further migraine headaches. Prior to starting at her migraine headaches were increased. She states she hasn't had the need to take Maxalt in over 3 months now. She is here today by video for neurologic followup doing quite well.  Followup June 29, 2021: This is a 21-year-old woman who presents today for followup of migraine headache. Since her last visit she is been experiencing a few more migraine headaches. However these are still treated with Maxalt well. She states that they have always been some months where she has more headaches another month where she has severe headaches. The headaches are similar in intensity and quality and character. She is here today for routine neurologic followup.  Followup January 13, 2022: This is a 21-year-old woman who presents today for followup of migraine headache. She is doing a bit better stating that now she go months without headaches. When she does get them they can be painful there are associated with stress. She utilizes rizatriptan for breakthrough headaches she takes time it helps. She is here today for neurologic followup visit.  Follow-up September 13, 2022: This is a 22-year-old woman who presents today for neurologic follow-up of migraine headache.  She is currently still having migraines about twice per month.  She takes rizatriptan which helps with the pain.  However now she is noticing that her headaches are accompanied more often with nausea and vomiting.  This is new for her.  She is otherwise well without any new neurologic complaints.  Follow-up September 19, 2023: This is a 23-year-old woman who presents today with history of migraine headache.  She is currently having 1-2 migraine headaches per month.  This is stable from last year.  She has will identify triggers for her migraine headaches.  Sometimes she can avoid them others is unavoidable.  She is currently taking rizatriptan as prevention for headache as well as Zofran for migraine associated nausea.  She is here today for neurologic follow-up.  Follow-up September 16, 2024: This is a 24-year-old woman who presents today with migraine headache.  Since graduating from college she is doing much better.  She is now having 1-2 migraine headaches per year as opposed to that per month.  She continues to take rizatriptan for breakthrough migraines which helps.  Occasionally she will need to take Zofran.  She is here today for neurologic follow-up.

## 2025-07-16 ENCOUNTER — NON-APPOINTMENT (OUTPATIENT)
Age: 25
End: 2025-07-16

## 2025-07-17 ENCOUNTER — APPOINTMENT (OUTPATIENT)
Dept: OBGYN | Facility: CLINIC | Age: 25
End: 2025-07-17
Payer: COMMERCIAL

## 2025-07-17 VITALS
DIASTOLIC BLOOD PRESSURE: 79 MMHG | WEIGHT: 150 LBS | BODY MASS INDEX: 28.32 KG/M2 | SYSTOLIC BLOOD PRESSURE: 127 MMHG | HEIGHT: 61 IN

## 2025-07-17 PROBLEM — Z88.9 HISTORY OF SEASONAL ALLERGIES: Status: RESOLVED | Noted: 2025-07-17 | Resolved: 2025-07-17

## 2025-07-17 PROBLEM — Z01.419 WELL WOMAN EXAM WITH ROUTINE GYNECOLOGICAL EXAM: Status: ACTIVE | Noted: 2025-07-17

## 2025-07-17 PROBLEM — Z86.59 HISTORY OF ANXIETY: Status: RESOLVED | Noted: 2025-07-17 | Resolved: 2025-07-17

## 2025-07-17 PROCEDURE — 99385 PREV VISIT NEW AGE 18-39: CPT

## 2025-07-20 LAB
C TRACH RRNA SPEC QL NAA+PROBE: NOT DETECTED
N GONORRHOEA RRNA SPEC QL NAA+PROBE: NOT DETECTED
SOURCE TP AMPLIFICATION: NORMAL

## 2025-07-22 LAB — CYTOLOGY CVX/VAG DOC THIN PREP: NORMAL

## 2025-09-15 ENCOUNTER — APPOINTMENT (OUTPATIENT)
Dept: NEUROLOGY | Facility: CLINIC | Age: 25
End: 2025-09-15
Payer: COMMERCIAL

## 2025-09-15 DIAGNOSIS — G43.109 MIGRAINE WITH AURA, NOT INTRACTABLE, W/OUT STATUS MIGRAINOSUS: ICD-10-CM

## 2025-09-15 PROCEDURE — G2211 COMPLEX E/M VISIT ADD ON: CPT | Mod: NC

## 2025-09-15 PROCEDURE — 99213 OFFICE O/P EST LOW 20 MIN: CPT
